# Patient Record
Sex: FEMALE | Race: BLACK OR AFRICAN AMERICAN | NOT HISPANIC OR LATINO | URBAN - METROPOLITAN AREA
[De-identification: names, ages, dates, MRNs, and addresses within clinical notes are randomized per-mention and may not be internally consistent; named-entity substitution may affect disease eponyms.]

---

## 2019-02-23 ENCOUNTER — APPOINTMENT (RX ONLY)
Dept: URBAN - METROPOLITAN AREA CLINIC 12 | Facility: CLINIC | Age: 39
Setting detail: DERMATOLOGY
End: 2019-02-23

## 2019-02-23 PROCEDURE — ? CHEMICAL PEEL

## 2019-02-23 NOTE — PROCEDURE: CHEMICAL PEEL
Post Peel Care: After the procedure  EltaMD Sport was applied to the treated areas. Sun protection and post-care instructions were reviewed with the patient.
How Many Applications? (If You Leave As 0, Will Not Render): 2
Consent: Prior to the procedure, written consent was obtained and risks, benefits, expectations and alternatives were reviewed, including, but not limited to,  redness, peeling, blistering, pigmentary change, scarring, infection, and pain.
Chemical Peel: Micropeel Sensitive
Total Treatments Expected: 4
Detail Level: Simple
Treatment Number: 1
Price $ (Use Numbers Only, No Text Please.): 395

## 2019-03-23 ENCOUNTER — APPOINTMENT (RX ONLY)
Dept: URBAN - METROPOLITAN AREA CLINIC 12 | Facility: CLINIC | Age: 39
Setting detail: DERMATOLOGY
End: 2019-03-23

## 2019-03-23 DIAGNOSIS — Z41.9 ENCOUNTER FOR PROCEDURE FOR PURPOSES OTHER THAN REMEDYING HEALTH STATE, UNSPECIFIED: ICD-10-CM

## 2019-03-23 PROCEDURE — ? CHEMICAL PEEL

## 2019-03-23 ASSESSMENT — LOCATION DETAILED DESCRIPTION DERM
LOCATION DETAILED: RIGHT INFERIOR MEDIAL FOREHEAD
LOCATION DETAILED: LEFT SUPERIOR ANTERIOR NECK

## 2019-03-23 ASSESSMENT — LOCATION ZONE DERM
LOCATION ZONE: FACE
LOCATION ZONE: NECK

## 2019-03-23 ASSESSMENT — LOCATION SIMPLE DESCRIPTION DERM
LOCATION SIMPLE: LEFT ANTERIOR NECK
LOCATION SIMPLE: RIGHT FOREHEAD

## 2019-03-23 NOTE — PROCEDURE: CHEMICAL PEEL
Treatment Number: 2
Chemical Peel: Micropeel Sensitive
Post Peel Care: After the procedure  EltaMD Sport was applied to the treated areas. Sun protection and post-care instructions were reviewed with the patient.
Total Treatments Expected: 4
Consent: Prior to the procedure, written consent was obtained and risks, benefits, expectations and alternatives were reviewed, including, but not limited to,  redness, peeling, blistering, pigmentary change, scarring, infection, and pain.
Detail Level: Simple

## 2019-06-25 ENCOUNTER — APPOINTMENT (RX ONLY)
Dept: URBAN - METROPOLITAN AREA CLINIC 12 | Facility: CLINIC | Age: 39
Setting detail: DERMATOLOGY
End: 2019-06-25

## 2019-06-25 DIAGNOSIS — Z41.9 ENCOUNTER FOR PROCEDURE FOR PURPOSES OTHER THAN REMEDYING HEALTH STATE, UNSPECIFIED: ICD-10-CM

## 2019-06-25 PROCEDURE — ? FILLERS

## 2019-06-25 PROCEDURE — ? CONSULTATION - FILLERS

## 2019-06-25 NOTE — PROCEDURE: FILLERS
Jawline Filler Volume In Cc: 0
Price $ (Numeric Only, No Text Please.): 180
Use Map Statement For Sites (Optional): Yes
Additional Area 4 Location: smile lines
Topical Anesthetic #2: tetracaine
Topical Anesthetic Base: ointment
Additional Area 4 Volume In Cc: 0.1
Filler: Belotero
Additional Area 2 Location: glabellar line
Additional Area 5 Location: oral commissures
Additional Area 1 Location: glabellar/nasal root lines
Map Statment: See attached map for complete details
Topical % #1: 23
Additional Area 5 Location: frontalis rhytids
Additional Area 3 Location: smile line
Lot #: [id number]
Detail Level: Simple
Additional Area 2 Location: eyelid cheek margin
Topical Anesthetic #1: lidocaine
Additional Area 4 Location: cheek depressions
Additional Area 3 Location: perioral lines
Topical % #2: 7
Administered By (Optional): Stefania Augustine RN
Expiration Date (Month Year): 04/2020
Consent: Written consent obtained. Risks include but not limited to bruising, beading, irregular texture, ulceration, infection, allergic reaction, scar formation, incomplete augmentation, temporary nature, procedural pain.

## 2019-06-25 NOTE — PROCEDURE: CONSULTATION - FILLERS
Lateral Face Secondary Filler Volume In Cc (Estimated): 0
Send Procedure Quote As Charge: No
Detail Level: Zone

## 2020-01-28 ENCOUNTER — APPOINTMENT (RX ONLY)
Dept: URBAN - METROPOLITAN AREA CLINIC 12 | Facility: CLINIC | Age: 40
Setting detail: DERMATOLOGY
End: 2020-01-28

## 2020-01-28 DIAGNOSIS — Z41.9 ENCOUNTER FOR PROCEDURE FOR PURPOSES OTHER THAN REMEDYING HEALTH STATE, UNSPECIFIED: ICD-10-CM

## 2020-01-28 PROCEDURE — ? FILLERS

## 2020-01-28 NOTE — PROCEDURE: FILLERS
Vermillion Lips Filler Volume In Cc: 0
Additional Area 3 Location: oral commissures
Topical % #2: 7
Topical Anesthetic Base: ointment
Expiration Date (Month Year): 10/2020
Detail Level: Simple
Use Map Statement For Sites (Optional): Yes
Consent: Written consent obtained. Risks include but not limited to bruising, beading, irregular texture, ulceration, infection, allergic reaction, scar formation, incomplete augmentation, temporary nature, procedural pain.
Topical Anesthetic #2: tetracaine
Additional Area 4 Location: cheek depressions
Price $ (Numeric Only, No Text Please.): 395
Additional Area 4 Location: smile lines
Filler: Juvederm Ultra XC
Additional Area 4 Volume In Cc: 0.1
Additional Area 5 Location: eyebrow wrinkles
Additional Area 1 Location: perioral lines
Topical % #1: 23
Additional Area 5 Location: frontalis rhytids
Additional Area 1 Location: glabellar or forehead lines
Map Statment: See attached map for complete details
Administered By (Optional): Stefania Augustine RN
Topical Anesthetic #1: lidocaine
Lot #: [id number]
Lot #: [id number]
Additional Area 2 Location: glabellar line
Additional Area 3 Location: smile line
Reconstituted With (Will Only Render If Nonblank): rep sample
Filler: Belotero